# Patient Record
Sex: FEMALE | Race: WHITE | NOT HISPANIC OR LATINO
[De-identification: names, ages, dates, MRNs, and addresses within clinical notes are randomized per-mention and may not be internally consistent; named-entity substitution may affect disease eponyms.]

---

## 2017-04-26 ENCOUNTER — RECORD ABSTRACTING (OUTPATIENT)
Age: 37
End: 2017-04-26

## 2017-04-27 ENCOUNTER — APPOINTMENT (OUTPATIENT)
Dept: INTERNAL MEDICINE | Facility: CLINIC | Age: 37
End: 2017-04-27

## 2017-04-27 VITALS
DIASTOLIC BLOOD PRESSURE: 76 MMHG | SYSTOLIC BLOOD PRESSURE: 118 MMHG | HEART RATE: 104 BPM | HEIGHT: 62 IN | OXYGEN SATURATION: 98 % | BODY MASS INDEX: 33.13 KG/M2 | WEIGHT: 180 LBS | TEMPERATURE: 98.4 F | RESPIRATION RATE: 18 BRPM

## 2017-04-27 DIAGNOSIS — F41.1 GENERALIZED ANXIETY DISORDER: ICD-10-CM

## 2017-04-27 DIAGNOSIS — Z80.3 FAMILY HISTORY OF MALIGNANT NEOPLASM OF BREAST: ICD-10-CM

## 2017-04-27 DIAGNOSIS — Z78.9 OTHER SPECIFIED HEALTH STATUS: ICD-10-CM

## 2017-04-27 RX ORDER — ALPRAZOLAM 0.25 MG/1
0.25 TABLET ORAL
Refills: 0 | Status: COMPLETED | COMMUNITY
End: 2017-04-27

## 2017-06-14 ENCOUNTER — MEDICATION RENEWAL (OUTPATIENT)
Age: 37
End: 2017-06-14

## 2017-07-25 ENCOUNTER — APPOINTMENT (OUTPATIENT)
Dept: INTERNAL MEDICINE | Facility: CLINIC | Age: 37
End: 2017-07-25

## 2017-07-25 ENCOUNTER — NON-APPOINTMENT (OUTPATIENT)
Age: 37
End: 2017-07-25

## 2017-07-25 VITALS
BODY MASS INDEX: 33.68 KG/M2 | WEIGHT: 183 LBS | DIASTOLIC BLOOD PRESSURE: 82 MMHG | OXYGEN SATURATION: 98 % | SYSTOLIC BLOOD PRESSURE: 120 MMHG | HEIGHT: 62 IN | HEART RATE: 74 BPM | RESPIRATION RATE: 16 BRPM | TEMPERATURE: 98.4 F

## 2017-07-26 LAB
HCG SERPL QL: NEGATIVE
PAPP-A SERPL-ACNC: <1 MIU/ML
TSH SERPL-ACNC: 1.29 UIU/ML

## 2017-07-28 ENCOUNTER — MEDICATION RENEWAL (OUTPATIENT)
Age: 37
End: 2017-07-28

## 2017-09-18 ENCOUNTER — MEDICATION RENEWAL (OUTPATIENT)
Age: 37
End: 2017-09-18

## 2017-11-08 ENCOUNTER — RESULT CHARGE (OUTPATIENT)
Age: 37
End: 2017-11-08

## 2017-11-08 ENCOUNTER — APPOINTMENT (OUTPATIENT)
Dept: INTERNAL MEDICINE | Facility: CLINIC | Age: 37
End: 2017-11-08
Payer: COMMERCIAL

## 2017-11-08 VITALS
TEMPERATURE: 97.9 F | OXYGEN SATURATION: 97 % | BODY MASS INDEX: 34.04 KG/M2 | DIASTOLIC BLOOD PRESSURE: 70 MMHG | RESPIRATION RATE: 18 BRPM | WEIGHT: 185 LBS | HEIGHT: 62 IN | HEART RATE: 92 BPM | SYSTOLIC BLOOD PRESSURE: 106 MMHG

## 2017-11-08 LAB — S PYO AG SPEC QL IA: NEGATIVE

## 2017-11-08 PROCEDURE — 99214 OFFICE O/P EST MOD 30 MIN: CPT | Mod: 25

## 2017-11-08 PROCEDURE — 87880 STREP A ASSAY W/OPTIC: CPT | Mod: QW

## 2017-12-01 ENCOUNTER — MEDICATION RENEWAL (OUTPATIENT)
Age: 37
End: 2017-12-01

## 2017-12-14 ENCOUNTER — APPOINTMENT (OUTPATIENT)
Dept: INTERNAL MEDICINE | Facility: CLINIC | Age: 37
End: 2017-12-14
Payer: COMMERCIAL

## 2017-12-14 VITALS
TEMPERATURE: 97.8 F | DIASTOLIC BLOOD PRESSURE: 72 MMHG | HEIGHT: 62 IN | HEART RATE: 70 BPM | RESPIRATION RATE: 16 BRPM | WEIGHT: 186 LBS | SYSTOLIC BLOOD PRESSURE: 116 MMHG | OXYGEN SATURATION: 98 % | BODY MASS INDEX: 34.23 KG/M2

## 2017-12-14 PROCEDURE — 99214 OFFICE O/P EST MOD 30 MIN: CPT

## 2017-12-14 RX ORDER — AZITHROMYCIN 250 MG/1
250 TABLET, FILM COATED ORAL
Qty: 1 | Refills: 0 | Status: DISCONTINUED | COMMUNITY
Start: 2017-11-08 | End: 2017-12-14

## 2017-12-14 RX ORDER — ALPRAZOLAM 0.25 MG/1
0.25 TABLET ORAL
Qty: 60 | Refills: 0 | Status: DISCONTINUED | COMMUNITY
Start: 2017-04-27 | End: 2017-12-14

## 2018-05-17 ENCOUNTER — APPOINTMENT (OUTPATIENT)
Dept: INTERNAL MEDICINE | Facility: CLINIC | Age: 38
End: 2018-05-17
Payer: COMMERCIAL

## 2018-05-17 VITALS
RESPIRATION RATE: 18 BRPM | SYSTOLIC BLOOD PRESSURE: 120 MMHG | HEIGHT: 62 IN | BODY MASS INDEX: 33.13 KG/M2 | OXYGEN SATURATION: 98 % | WEIGHT: 180 LBS | TEMPERATURE: 98.7 F | HEART RATE: 90 BPM | DIASTOLIC BLOOD PRESSURE: 58 MMHG

## 2018-05-17 PROCEDURE — 99214 OFFICE O/P EST MOD 30 MIN: CPT

## 2018-05-17 RX ORDER — TOBRAMYCIN 3 MG/ML
0.3 SOLUTION/ DROPS OPHTHALMIC
Qty: 5 | Refills: 0 | Status: DISCONTINUED | COMMUNITY
Start: 2018-05-06

## 2018-05-17 NOTE — HISTORY OF PRESENT ILLNESS
[FreeTextEntry8] : Anxiety\benjamin Has anxiety intermittently for several years which was treated effectively with xanax.    Over the past week she has had increased anxiety.  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 She is also dealing with her grandparents estate.  She had been doing a lot of meditation and  hadn't needed any xanax in some time.   Never had formal psychotherapy or on SSRI.         \par \benjamin Also has a lump on her left wrist for 1 year.  Non tender. Told years ago -ganglion.

## 2018-05-17 NOTE — COUNSELING
[Healthy eating counseling provided] : healthy eating [___ min/wk activity recommended] : [unfilled] min/wk activity recommended [Engage in a relaxing activity] : Engage in a relaxing activity

## 2018-05-17 NOTE — REVIEW OF SYSTEMS
[Joint Swelling] : joint swelling [Anxiety] : anxiety [Negative] : Neurological [Joint Stiffness] : no joint stiffness [FreeTextEntry4] : cold sore

## 2018-05-17 NOTE — ASSESSMENT
[FreeTextEntry1] : Refilled xanax.  Pt advised if usage required for more than 2 weeks should consider use of SSRI\par Side effects of meds reviewed including potential for sedation and dependency.   Advised not to drink alcohol, drive or operate heavy machinery while on medication.\par Psychotherapy recommended however pt declines at this time.  \par continue exercise and meditation\par Valtrex prn for outbreaks of HSV1\par Do FBW check yearly labs including thyroids.\par

## 2018-05-17 NOTE — PHYSICAL EXAM
[No Acute Distress] : no acute distress [Well Nourished] : well nourished [No JVD] : no jugular venous distention [Supple] : supple [No Respiratory Distress] : no respiratory distress  [Clear to Auscultation] : lungs were clear to auscultation bilaterally [Normal Rate] : normal rate  [Regular Rhythm] : with a regular rhythm [No Edema] : there was no peripheral edema [No Extremity Clubbing/Cyanosis] : no extremity clubbing/cyanosis [de-identified] : faded lesion to lower lip [de-identified] : 1 cm mobile soft nodule to dorsum of left wrist.  Non tender.

## 2018-05-22 ENCOUNTER — RESULT REVIEW (OUTPATIENT)
Age: 38
End: 2018-05-22

## 2018-05-23 LAB
25(OH)D3 SERPL-MCNC: 7.2 NG/ML
ALBUMIN SERPL ELPH-MCNC: 4.2 G/DL
ALP BLD-CCNC: 64 U/L
ALT SERPL-CCNC: 24 U/L
ANION GAP SERPL CALC-SCNC: 13 MMOL/L
APPEARANCE: CLEAR
AST SERPL-CCNC: 15 U/L
BACTERIA: NEGATIVE
BASOPHILS # BLD AUTO: 0.02 K/UL
BASOPHILS NFR BLD AUTO: 0.3 %
BILIRUB SERPL-MCNC: 0.3 MG/DL
BILIRUBIN URINE: NEGATIVE
BLOOD URINE: ABNORMAL
BUN SERPL-MCNC: 13 MG/DL
CALCIUM SERPL-MCNC: 9.4 MG/DL
CHLORIDE SERPL-SCNC: 106 MMOL/L
CHOLEST SERPL-MCNC: 168 MG/DL
CHOLEST/HDLC SERPL: 3.1 RATIO
CO2 SERPL-SCNC: 24 MMOL/L
COLOR: YELLOW
CREAT SERPL-MCNC: 0.67 MG/DL
EOSINOPHIL # BLD AUTO: 0.15 K/UL
EOSINOPHIL NFR BLD AUTO: 2.3 %
GLUCOSE QUALITATIVE U: NEGATIVE MG/DL
GLUCOSE SERPL-MCNC: 101 MG/DL
HBA1C MFR BLD HPLC: 4.9 %
HCT VFR BLD CALC: 38 %
HDLC SERPL-MCNC: 54 MG/DL
HGB BLD-MCNC: 12.1 G/DL
HYALINE CASTS: 0 /LPF
IMM GRANULOCYTES NFR BLD AUTO: 0.3 %
KETONES URINE: NEGATIVE
LDLC SERPL CALC-MCNC: 99 MG/DL
LEUKOCYTE ESTERASE URINE: NEGATIVE
LYMPHOCYTES # BLD AUTO: 2.31 K/UL
LYMPHOCYTES NFR BLD AUTO: 35.7 %
MAN DIFF?: NORMAL
MCHC RBC-ENTMCNC: 31.8 GM/DL
MCHC RBC-ENTMCNC: 31.8 PG
MCV RBC AUTO: 99.7 FL
MICROSCOPIC-UA: NORMAL
MONOCYTES # BLD AUTO: 0.57 K/UL
MONOCYTES NFR BLD AUTO: 8.8 %
NEUTROPHILS # BLD AUTO: 3.4 K/UL
NEUTROPHILS NFR BLD AUTO: 52.6 %
NITRITE URINE: NEGATIVE
PH URINE: 5
PLATELET # BLD AUTO: 399 K/UL
POTASSIUM SERPL-SCNC: 4.2 MMOL/L
PROT SERPL-MCNC: 7 G/DL
PROTEIN URINE: NEGATIVE MG/DL
RBC # BLD: 3.81 M/UL
RBC # FLD: 13.3 %
RED BLOOD CELLS URINE: 3 /HPF
SODIUM SERPL-SCNC: 143 MMOL/L
SPECIFIC GRAVITY URINE: 1.03
SQUAMOUS EPITHELIAL CELLS: 10 /HPF
TRIGL SERPL-MCNC: 73 MG/DL
TSH SERPL-ACNC: 0.86 UIU/ML
UROBILINOGEN URINE: NEGATIVE MG/DL
WBC # FLD AUTO: 6.47 K/UL
WHITE BLOOD CELLS URINE: 1 /HPF

## 2018-05-24 ENCOUNTER — RESULT REVIEW (OUTPATIENT)
Age: 38
End: 2018-05-24

## 2018-06-29 ENCOUNTER — RX RENEWAL (OUTPATIENT)
Age: 38
End: 2018-06-29

## 2018-07-16 ENCOUNTER — RX RENEWAL (OUTPATIENT)
Age: 38
End: 2018-07-16

## 2018-07-22 PROBLEM — Z78.9 ALCOHOL USE: Status: ACTIVE | Noted: 2017-04-27

## 2018-08-10 ENCOUNTER — MEDICATION RENEWAL (OUTPATIENT)
Age: 38
End: 2018-08-10

## 2018-08-31 ENCOUNTER — RX RENEWAL (OUTPATIENT)
Age: 38
End: 2018-08-31

## 2018-09-24 ENCOUNTER — RX RENEWAL (OUTPATIENT)
Age: 38
End: 2018-09-24

## 2018-10-08 ENCOUNTER — RX RENEWAL (OUTPATIENT)
Age: 38
End: 2018-10-08

## 2018-11-01 ENCOUNTER — MEDICATION RENEWAL (OUTPATIENT)
Age: 38
End: 2018-11-01

## 2018-12-18 ENCOUNTER — RX RENEWAL (OUTPATIENT)
Age: 38
End: 2018-12-18

## 2019-02-15 ENCOUNTER — MEDICATION RENEWAL (OUTPATIENT)
Age: 39
End: 2019-02-15

## 2019-03-18 ENCOUNTER — RX RENEWAL (OUTPATIENT)
Age: 39
End: 2019-03-18

## 2019-03-25 ENCOUNTER — MEDICATION RENEWAL (OUTPATIENT)
Age: 39
End: 2019-03-25

## 2019-04-03 ENCOUNTER — APPOINTMENT (OUTPATIENT)
Dept: INTERNAL MEDICINE | Facility: CLINIC | Age: 39
End: 2019-04-03
Payer: COMMERCIAL

## 2019-04-03 VITALS
BODY MASS INDEX: 27.97 KG/M2 | TEMPERATURE: 98.1 F | SYSTOLIC BLOOD PRESSURE: 100 MMHG | HEIGHT: 62 IN | OXYGEN SATURATION: 98 % | DIASTOLIC BLOOD PRESSURE: 68 MMHG | WEIGHT: 152 LBS | RESPIRATION RATE: 16 BRPM | HEART RATE: 76 BPM

## 2019-04-03 PROCEDURE — 99213 OFFICE O/P EST LOW 20 MIN: CPT

## 2019-04-03 NOTE — PHYSICAL EXAM
[No Acute Distress] : no acute distress [Well Nourished] : well nourished [Well Developed] : well developed [Supple] : supple [No Lymphadenopathy] : no lymphadenopathy [No Respiratory Distress] : no respiratory distress  [Clear to Auscultation] : lungs were clear to auscultation bilaterally [No Accessory Muscle Use] : no accessory muscle use [Normal Rate] : normal rate  [Regular Rhythm] : with a regular rhythm [Normal S1, S2] : normal S1 and S2 [Normal Posterior Cervical Nodes] : no posterior cervical lymphadenopathy [Normal Anterior Cervical Nodes] : no anterior cervical lymphadenopathy [Normal Gait] : normal gait [Coordination Grossly Intact] : coordination grossly intact [No Focal Deficits] : no focal deficits [Normal Affect] : the affect was normal [Alert and Oriented x3] : oriented to person, place, and time [Normal Insight/Judgement] : insight and judgment were intact

## 2019-04-03 NOTE — HISTORY OF PRESENT ILLNESS
[FreeTextEntry8] : The patient is a 38 year-old female presents today with history of anxiety. She is requesting renewal of her Xanax. She will be traveling to [facility] in [place] next week and will be using it for the flights. She takes Xanax intermittently for her treatment of anxiety symptoms, with goof relief. She has never had formal therapy or been on an SSRI. She denies suicidal ideation.

## 2019-04-03 NOTE — REVIEW OF SYSTEMS
[Anxiety] : anxiety [Negative] : Heme/Lymph [Fever] : no fever [Chills] : no chills [Fatigue] : no fatigue [de-identified] : see HPI

## 2019-04-03 NOTE — ASSESSMENT
[FreeTextEntry1] : refilled Xanax\par side effects of medication  reviewed including sedation and potential for dependency.\par Counseling for anxiety recommended, however, pt declines \par Pt needs appt for yearly physical, states will call  when back for [place]

## 2019-05-16 ENCOUNTER — MEDICATION RENEWAL (OUTPATIENT)
Age: 39
End: 2019-05-16

## 2019-06-14 ENCOUNTER — MEDICATION RENEWAL (OUTPATIENT)
Age: 39
End: 2019-06-14

## 2019-07-10 ENCOUNTER — LABORATORY RESULT (OUTPATIENT)
Age: 39
End: 2019-07-10

## 2019-07-10 ENCOUNTER — APPOINTMENT (OUTPATIENT)
Dept: INTERNAL MEDICINE | Facility: CLINIC | Age: 39
End: 2019-07-10
Payer: COMMERCIAL

## 2019-07-10 VITALS
HEIGHT: 62 IN | TEMPERATURE: 98.3 F | SYSTOLIC BLOOD PRESSURE: 116 MMHG | OXYGEN SATURATION: 98 % | DIASTOLIC BLOOD PRESSURE: 80 MMHG | RESPIRATION RATE: 18 BRPM | HEART RATE: 86 BPM | WEIGHT: 156 LBS | BODY MASS INDEX: 28.71 KG/M2

## 2019-07-10 DIAGNOSIS — Z87.898 PERSONAL HISTORY OF OTHER SPECIFIED CONDITIONS: ICD-10-CM

## 2019-07-10 DIAGNOSIS — Z87.09 PERSONAL HISTORY OF OTHER DISEASES OF THE RESPIRATORY SYSTEM: ICD-10-CM

## 2019-07-10 DIAGNOSIS — K14.6 GLOSSODYNIA: ICD-10-CM

## 2019-07-10 DIAGNOSIS — R21 RASH AND OTHER NONSPECIFIC SKIN ERUPTION: ICD-10-CM

## 2019-07-10 DIAGNOSIS — Z72.820 SLEEP DEPRIVATION: ICD-10-CM

## 2019-07-10 DIAGNOSIS — R53.83 OTHER FATIGUE: ICD-10-CM

## 2019-07-10 PROCEDURE — 99214 OFFICE O/P EST MOD 30 MIN: CPT

## 2019-07-10 RX ORDER — ERGOCALCIFEROL 1.25 MG/1
1.25 MG CAPSULE, LIQUID FILLED ORAL
Qty: 8 | Refills: 0 | Status: DISCONTINUED | COMMUNITY
Start: 2018-07-16 | End: 2019-07-10

## 2019-07-10 RX ORDER — CEPHALEXIN 500 MG/1
500 CAPSULE ORAL
Qty: 21 | Refills: 0 | Status: DISCONTINUED | COMMUNITY
Start: 2019-06-17 | End: 2019-07-10

## 2019-07-10 RX ORDER — COPPER 313.4 MG/1
INTRAUTERINE DEVICE INTRAUTERINE
Qty: 1 | Refills: 0 | Status: ACTIVE | COMMUNITY
Start: 2019-06-17

## 2019-07-10 NOTE — PHYSICAL EXAM
[No Acute Distress] : no acute distress [Well Developed] : well developed [Well Nourished] : well nourished [Well-Appearing] : well-appearing [Normal Sclera/Conjunctiva] : normal sclera/conjunctiva [PERRL] : pupils equal round and reactive to light [Normal Outer Ear/Nose] : the outer ears and nose were normal in appearance [EOMI] : extraocular movements intact [Normal Oropharynx] : the oropharynx was normal [Supple] : supple [No JVD] : no jugular venous distention [No Lymphadenopathy] : no lymphadenopathy [No Accessory Muscle Use] : no accessory muscle use [No Respiratory Distress] : no respiratory distress  [Thyroid Normal, No Nodules] : the thyroid was normal and there were no nodules present [Normal Rate] : normal rate  [Clear to Auscultation] : lungs were clear to auscultation bilaterally [Regular Rhythm] : with a regular rhythm [No Murmur] : no murmur heard [Normal S1, S2] : normal S1 and S2 [No Varicosities] : no varicosities [No Carotid Bruits] : no carotid bruits [No Abdominal Bruit] : a ~M bruit was not heard ~T in the abdomen [Pedal Pulses Present] : the pedal pulses are present [No Edema] : there was no peripheral edema [No Extremity Clubbing/Cyanosis] : no extremity clubbing/cyanosis [No Palpable Aorta] : no palpable aorta [Soft] : abdomen soft [Non Tender] : non-tender [Non-distended] : non-distended [No Masses] : no abdominal mass palpated [No HSM] : no HSM [Normal Bowel Sounds] : normal bowel sounds [Normal Anterior Cervical Nodes] : no anterior cervical lymphadenopathy [Normal Posterior Cervical Nodes] : no posterior cervical lymphadenopathy [No Spinal Tenderness] : no spinal tenderness [No CVA Tenderness] : no CVA  tenderness [Grossly Normal Strength/Tone] : grossly normal strength/tone [No Joint Swelling] : no joint swelling [No Focal Deficits] : no focal deficits [No Rash] : no rash [Coordination Grossly Intact] : coordination grossly intact [Normal Gait] : normal gait [Deep Tendon Reflexes (DTR)] : deep tendon reflexes were 2+ and symmetric [Normal Affect] : the affect was normal [Normal Insight/Judgement] : insight and judgment were intact

## 2019-07-10 NOTE — HISTORY OF PRESENT ILLNESS
[de-identified] : Ms. Figueroa presents for followup evaluation. She is complaining of headache and dizziness. She was recently started on NuvaRing for birth control approximately 2 weeks ago. After starting on the NuvaRing Ms. Figueroa states she began getting symptoms of dizziness, headache and nausea. After approximately one week she discontinued it. She continues to have symptoms although they are somewhat improved. Ms. Figueroa states that she's had previous adverse reactions to birth control in the past. She has a prescription for an IUD. The patient denies any chest pain, shortness of breath or palpitations. She has no hematuria or dysuria. [FreeTextEntry1] : Dizziness,  headache

## 2019-07-10 NOTE — PLAN
[FreeTextEntry1] : Ms. Figueroa presents for followup evaluation. She is complaining of dizziness, headache and lightheadedness. She had been started on NuvaRing for birth control however she discontinued it after one week. Patient will have comprehensive blood profile drawn. She will followup with her gynecologist here she does have a prescription for an IUD.

## 2019-07-11 LAB
ALBUMIN SERPL ELPH-MCNC: 4.1 G/DL
ALP BLD-CCNC: 58 U/L
ALT SERPL-CCNC: 18 U/L
ANION GAP SERPL CALC-SCNC: 11 MMOL/L
APPEARANCE: CLEAR
AST SERPL-CCNC: 18 U/L
BACTERIA: NEGATIVE
BASOPHILS # BLD AUTO: 0.04 K/UL
BASOPHILS NFR BLD AUTO: 0.7 %
BILIRUB SERPL-MCNC: 0.3 MG/DL
BILIRUBIN URINE: NEGATIVE
BLOOD URINE: NEGATIVE
BUN SERPL-MCNC: 8 MG/DL
CALCIUM SERPL-MCNC: 9 MG/DL
CHLORIDE SERPL-SCNC: 107 MMOL/L
CHOLEST SERPL-MCNC: 147 MG/DL
CHOLEST/HDLC SERPL: 2.4 RATIO
CO2 SERPL-SCNC: 22 MMOL/L
COLOR: NORMAL
CREAT SERPL-MCNC: 0.67 MG/DL
EOSINOPHIL # BLD AUTO: 0.15 K/UL
EOSINOPHIL NFR BLD AUTO: 2.5 %
ESTIMATED AVERAGE GLUCOSE: 88 MG/DL
GLUCOSE QUALITATIVE U: NEGATIVE
GLUCOSE SERPL-MCNC: 79 MG/DL
HBA1C MFR BLD HPLC: 4.7 %
HCT VFR BLD CALC: 39.3 %
HDLC SERPL-MCNC: 61 MG/DL
HGB BLD-MCNC: 12 G/DL
HYALINE CASTS: 1 /LPF
IMM GRANULOCYTES NFR BLD AUTO: 0.3 %
IRON SERPL-MCNC: 42 UG/DL
KETONES URINE: NEGATIVE
LDLC SERPL CALC-MCNC: 78 MG/DL
LEUKOCYTE ESTERASE URINE: NEGATIVE
LYMPHOCYTES # BLD AUTO: 2.82 K/UL
LYMPHOCYTES NFR BLD AUTO: 46.2 %
MAN DIFF?: NORMAL
MCHC RBC-ENTMCNC: 30.5 GM/DL
MCHC RBC-ENTMCNC: 32.3 PG
MCV RBC AUTO: 105.6 FL
MICROSCOPIC-UA: NORMAL
MONOCYTES # BLD AUTO: 0.51 K/UL
MONOCYTES NFR BLD AUTO: 8.3 %
NEUTROPHILS # BLD AUTO: 2.57 K/UL
NEUTROPHILS NFR BLD AUTO: 42 %
NITRITE URINE: NEGATIVE
PH URINE: 6
PLATELET # BLD AUTO: 345 K/UL
POTASSIUM SERPL-SCNC: 4.2 MMOL/L
PROT SERPL-MCNC: 6.4 G/DL
PROTEIN URINE: NEGATIVE
RBC # BLD: 3.72 M/UL
RBC # FLD: 12.7 %
RED BLOOD CELLS URINE: 1 /HPF
SODIUM SERPL-SCNC: 140 MMOL/L
SPECIFIC GRAVITY URINE: 1.02
SQUAMOUS EPITHELIAL CELLS: 5 /HPF
TRIGL SERPL-MCNC: 38 MG/DL
TSH SERPL-ACNC: 0.68 UIU/ML
UROBILINOGEN URINE: NORMAL
WBC # FLD AUTO: 6.11 K/UL
WHITE BLOOD CELLS URINE: 1 /HPF

## 2019-07-23 ENCOUNTER — MEDICATION RENEWAL (OUTPATIENT)
Age: 39
End: 2019-07-23

## 2019-09-10 ENCOUNTER — MEDICATION RENEWAL (OUTPATIENT)
Age: 39
End: 2019-09-10

## 2019-10-03 ENCOUNTER — MEDICATION RENEWAL (OUTPATIENT)
Age: 39
End: 2019-10-03

## 2019-11-26 ENCOUNTER — MEDICATION RENEWAL (OUTPATIENT)
Age: 39
End: 2019-11-26

## 2020-02-19 ENCOUNTER — APPOINTMENT (OUTPATIENT)
Dept: INTERNAL MEDICINE | Facility: CLINIC | Age: 40
End: 2020-02-19
Payer: COMMERCIAL

## 2020-02-19 VITALS
TEMPERATURE: 98.4 F | RESPIRATION RATE: 18 BRPM | HEIGHT: 62 IN | WEIGHT: 149 LBS | HEART RATE: 75 BPM | OXYGEN SATURATION: 99 % | DIASTOLIC BLOOD PRESSURE: 70 MMHG | SYSTOLIC BLOOD PRESSURE: 116 MMHG | BODY MASS INDEX: 27.42 KG/M2

## 2020-02-19 DIAGNOSIS — F17.210 NICOTINE DEPENDENCE, CIGARETTES, UNCOMPLICATED: ICD-10-CM

## 2020-02-19 PROCEDURE — 99214 OFFICE O/P EST MOD 30 MIN: CPT

## 2020-02-19 RX ORDER — MISOPROSTOL 100 UG/1
100 TABLET ORAL
Qty: 2 | Refills: 0 | Status: DISCONTINUED | COMMUNITY
Start: 2019-06-20 | End: 2020-02-19

## 2020-02-19 RX ORDER — ETONOGESTREL AND ETHINYL ESTRADIOL .12; .015 MG/D; MG/D
0.12-0.015 INSERT, EXTENDED RELEASE VAGINAL
Qty: 1 | Refills: 0 | Status: DISCONTINUED | COMMUNITY
Start: 2019-07-03 | End: 2020-02-19

## 2020-02-19 NOTE — PLAN
[FreeTextEntry1] : Ms. Figueroa presents for followup evaluation. She has a history of anxiety. Prescription for Xanax been renewed. Patient be given a prescription comprehensive blood profile Will follow up in 6 months for complete physical examination.

## 2020-02-19 NOTE — HISTORY OF PRESENT ILLNESS
[de-identified] : Ms. Figueroa presents for followup evaluation. She is feeling well. Patient denies any chest pain, shortness of breath palpitations. She does have some symptoms of anxiety. She denies being . Ms. Figueroa has no hematuria or dysuria. [FreeTextEntry1] : Followup

## 2020-02-19 NOTE — PHYSICAL EXAM
[No Acute Distress] : no acute distress [Well-Appearing] : well-appearing [Well Developed] : well developed [Well Nourished] : well nourished [PERRL] : pupils equal round and reactive to light [Normal Sclera/Conjunctiva] : normal sclera/conjunctiva [EOMI] : extraocular movements intact [Normal Oropharynx] : the oropharynx was normal [Normal Outer Ear/Nose] : the outer ears and nose were normal in appearance [No Lymphadenopathy] : no lymphadenopathy [Supple] : supple [No JVD] : no jugular venous distention [Thyroid Normal, No Nodules] : the thyroid was normal and there were no nodules present [No Respiratory Distress] : no respiratory distress  [No Accessory Muscle Use] : no accessory muscle use [Clear to Auscultation] : lungs were clear to auscultation bilaterally [Normal Rate] : normal rate  [Regular Rhythm] : with a regular rhythm [No Murmur] : no murmur heard [Normal S1, S2] : normal S1 and S2 [No Varicosities] : no varicosities [No Abdominal Bruit] : a ~M bruit was not heard ~T in the abdomen [No Carotid Bruits] : no carotid bruits [No Edema] : there was no peripheral edema [Pedal Pulses Present] : the pedal pulses are present [No Palpable Aorta] : no palpable aorta [No Extremity Clubbing/Cyanosis] : no extremity clubbing/cyanosis [Non-distended] : non-distended [Non Tender] : non-tender [Soft] : abdomen soft [No Masses] : no abdominal mass palpated [No HSM] : no HSM [Normal Bowel Sounds] : normal bowel sounds [Normal Posterior Cervical Nodes] : no posterior cervical lymphadenopathy [Normal Anterior Cervical Nodes] : no anterior cervical lymphadenopathy [No CVA Tenderness] : no CVA  tenderness [No Spinal Tenderness] : no spinal tenderness [No Joint Swelling] : no joint swelling [No Rash] : no rash [Grossly Normal Strength/Tone] : grossly normal strength/tone [Coordination Grossly Intact] : coordination grossly intact [No Focal Deficits] : no focal deficits [Deep Tendon Reflexes (DTR)] : deep tendon reflexes were 2+ and symmetric [Normal Gait] : normal gait [Normal Affect] : the affect was normal [Normal Insight/Judgement] : insight and judgment were intact

## 2020-06-11 ENCOUNTER — APPOINTMENT (OUTPATIENT)
Dept: INTERNAL MEDICINE | Facility: CLINIC | Age: 40
End: 2020-06-11
Payer: COMMERCIAL

## 2020-06-11 DIAGNOSIS — B00.1 HERPESVIRAL VESICULAR DERMATITIS: ICD-10-CM

## 2020-06-11 PROCEDURE — 99213 OFFICE O/P EST LOW 20 MIN: CPT | Mod: 95

## 2020-06-11 NOTE — ASSESSMENT
[FreeTextEntry1] : \par Pt declines starting SSRI at this time.\par Xanax refilled\par Side effects of meds reviewed including potential for sedation and dependency.   Advised not to drink alcohol, drive or operate heavy machinery while on medication.\par Continue psychotherapy.\par \par FU for CPE.  Do FBW prior to visit.

## 2020-06-11 NOTE — HISTORY OF PRESENT ILLNESS
[Home] : at home, [unfilled] , at the time of the visit. [Medical Office: ([facility])___] : at the medical office located in  [Verbal consent obtained from patient] : the patient, [unfilled] [FreeTextEntry1] : med refill [de-identified] : Requests visit to refill Xanax.  Takes meds prn for anxiety which during early period of pandemic was much worse but getting better now.  At times she does feel depressed.  No SI.  She has been in contact with her therapist which helps greatly.  Her anxiety is also managed with techniques she has learned over time and she feels overall she has a good handle on her anxiety.  Prior to the pandemic she felt her happiest.  Therapist has discussed use of meds as an option but she prefers not and is  Concerned with weight gain.\par Using Valtrex prn for cold sores - come out more with stress.

## 2020-08-30 ENCOUNTER — RESULT CHARGE (OUTPATIENT)
Age: 40
End: 2020-08-30

## 2020-08-31 ENCOUNTER — APPOINTMENT (OUTPATIENT)
Dept: INTERNAL MEDICINE | Facility: CLINIC | Age: 40
End: 2020-08-31
Payer: COMMERCIAL

## 2020-08-31 ENCOUNTER — NON-APPOINTMENT (OUTPATIENT)
Age: 40
End: 2020-08-31

## 2020-08-31 VITALS
BODY MASS INDEX: 29.44 KG/M2 | WEIGHT: 160 LBS | SYSTOLIC BLOOD PRESSURE: 104 MMHG | OXYGEN SATURATION: 99 % | TEMPERATURE: 98 F | RESPIRATION RATE: 18 BRPM | HEART RATE: 65 BPM | HEIGHT: 62 IN | DIASTOLIC BLOOD PRESSURE: 82 MMHG

## 2020-08-31 DIAGNOSIS — Z87.898 PERSONAL HISTORY OF OTHER SPECIFIED CONDITIONS: ICD-10-CM

## 2020-08-31 DIAGNOSIS — M67.432 GANGLION, LEFT WRIST: ICD-10-CM

## 2020-08-31 DIAGNOSIS — R42 DIZZINESS AND GIDDINESS: ICD-10-CM

## 2020-08-31 PROCEDURE — 99215 OFFICE O/P EST HI 40 MIN: CPT

## 2020-08-31 NOTE — PHYSICAL EXAM
[No Acute Distress] : no acute distress [Well Nourished] : well nourished [Well Developed] : well developed [Normal Sclera/Conjunctiva] : normal sclera/conjunctiva [PERRL] : pupils equal round and reactive to light [EOMI] : extraocular movements intact [Normal Outer Ear/Nose] : the outer ears and nose were normal in appearance [Normal Oropharynx] : the oropharynx was normal [No Lymphadenopathy] : no lymphadenopathy [Supple] : supple [No Respiratory Distress] : no respiratory distress  [No Accessory Muscle Use] : no accessory muscle use [Clear to Auscultation] : lungs were clear to auscultation bilaterally [Regular Rhythm] : with a regular rhythm [Normal Rate] : normal rate  [Normal S1, S2] : normal S1 and S2 [Pedal Pulses Present] : the pedal pulses are present [No Extremity Clubbing/Cyanosis] : no extremity clubbing/cyanosis [Soft] : abdomen soft [Non Tender] : non-tender [Non-distended] : non-distended [Normal Bowel Sounds] : normal bowel sounds [Normal Posterior Cervical Nodes] : no posterior cervical lymphadenopathy [Normal Anterior Cervical Nodes] : no anterior cervical lymphadenopathy [Coordination Grossly Intact] : coordination grossly intact [No Focal Deficits] : no focal deficits [Normal Gait] : normal gait [Normal Affect] : the affect was normal [Alert and Oriented x3] : oriented to person, place, and time [Normal Insight/Judgement] : insight and judgment were intact

## 2020-09-02 NOTE — REVIEW OF SYSTEMS
[Negative] : Psychiatric [Chills] : no chills [Fever] : no fever [Fatigue] : no fatigue [de-identified] : see HPi

## 2020-09-02 NOTE — ASSESSMENT
[As per surgery] : as per surgery [Patient Optimized for Surgery] : Patient optimized for surgery [FreeTextEntry2] : Close airway monitoring and oxygen saturation is required.  [FreeTextEntry4] : Advised to hold all vitamins, NSAIDs, including Motrin, Advil, ASA, fish oil, all supplements 7 days prior to surgery. \par \par COVID PCR per surgery.\par \par \par

## 2020-09-02 NOTE — RESULTS/DATA
[] : results reviewed [de-identified] : 8/31/20- results TWAN [de-identified] : 8/31/20- results TWAN [de-identified] : RSR 68, NL axis, No acute ST - T wave changes

## 2020-09-02 NOTE — HISTORY OF PRESENT ILLNESS
[No Pertinent Cardiac History] : no history of aortic stenosis, atrial fibrillation, coronary artery disease, recent myocardial infarction, or implantable device/pacemaker [No Pertinent Pulmonary History] : no history of asthma, COPD, sleep apnea, or smoking [No Adverse Anesthesia Reaction] : no adverse anesthesia reaction in self or family member [(Patient denies any chest pain, claudication, dyspnea on exertion, orthopnea, palpitations or syncope)] : Patient denies any chest pain, claudication, dyspnea on exertion, orthopnea, palpitations or syncope [Chronic Kidney Disease] : no chronic kidney disease [Chronic Anticoagulation] : no chronic anticoagulation [FreeTextEntry1] : left ganglion cyst [Diabetes] : no diabetes [FreeTextEntry3] :   [FreeTextEntry2] : 9/3/20  [FreeTextEntry4] : Pt is a 40 yr. old female with a h/o  of anxiety,. depression, obesity. She takes Xanax  PRN . \par She is scheduled for surgery left wrist cyst on 9/3/20.by Dr. Mckoy . She notes has had the cyst for years , it has been bothering her more over the past few months and interferes with ADL's .  \par She is a past smoker, 1 PPD x 9 years, quit 4 years ago. \par Denies fever, chills, shortness of  breath, chest pain,  cough or abdominal pain.  [FreeTextEntry8] : pt able to walk 1 flight up steps with out difficulty

## 2020-09-17 ENCOUNTER — EMERGENCY (EMERGENCY)
Facility: HOSPITAL | Age: 40
LOS: 0 days | Discharge: HOME | End: 2020-09-17
Payer: COMMERCIAL

## 2020-09-17 VITALS
DIASTOLIC BLOOD PRESSURE: 68 MMHG | HEART RATE: 90 BPM | TEMPERATURE: 99 F | RESPIRATION RATE: 17 BRPM | OXYGEN SATURATION: 98 % | SYSTOLIC BLOOD PRESSURE: 105 MMHG

## 2020-09-17 DIAGNOSIS — J06.9 ACUTE UPPER RESPIRATORY INFECTION, UNSPECIFIED: ICD-10-CM

## 2020-09-17 DIAGNOSIS — Z20.828 CONTACT WITH AND (SUSPECTED) EXPOSURE TO OTHER VIRAL COMMUNICABLE DISEASES: ICD-10-CM

## 2020-09-17 DIAGNOSIS — R05 COUGH: ICD-10-CM

## 2020-09-17 DIAGNOSIS — M79.10 MYALGIA, UNSPECIFIED SITE: ICD-10-CM

## 2020-09-17 DIAGNOSIS — R50.9 FEVER, UNSPECIFIED: ICD-10-CM

## 2020-09-17 PROCEDURE — U0003: CPT

## 2020-09-17 PROCEDURE — 99283 EMERGENCY DEPT VISIT LOW MDM: CPT

## 2020-09-17 NOTE — ED STATDOCS - PHYSICAL EXAMINATION
PA note: Constitutional: NAD AAOx3  Eyes: EOMI, pupils equal  Head: Normocephalic, atraumatic  ENT: Throat is clear without erythema. No exudates. Airway is patent.  Mouth: no airway obstruction.   Cardiac: S1 S2. regular rate   Resp: Non-labored breathing, no tachypnea. Lungs CTA b/l. No w/r/r. Equal chest expansion and rise. O2sat 100% RA  GI: Abd soft. NT/ND.   Neuro: CN2-12 intact. No focal deficits.   Skin: No rashes  ~Mango Harley PA-C

## 2020-09-17 NOTE — ED STATDOCS - NS ED ROS FT
ROS: Constitutional- +fever, Respiratory- +cough, NO SOB  Cardiac- no chest pain, no palpitations, ENT- +rhinorrhea, +sore throat, +sinus congestion. DENIES loss of sense of smell or taste.  Abdomen- No nausea, no vomiting, no diarrhea.  Urinary- no dysuria, no urgency, no frequency.  Skin- No rashes ~ Mango Harley PA-C

## 2020-09-17 NOTE — ED STATDOCS - PROGRESS NOTE DETAILS
How to get your Coronavirus (COVID-19) Testing Results:   Please be advised that you were tested for the coronavirus (COVID-19) in the Emergency Department at [facility].  You are to maintain self-quarantine procedures for 14 days until instructed otherwise by one of our healthcare agents. Please note that the test may take up to 2-4 days to result.  If you do not hear from us within 72 hours and you'd like to check on your results, you can call on of our coronavirus specialists at [address] [place] [address] (available 24/7).  Please DO NOT call the site where you received the test to obtain your results. ~Mango Harley PA-C

## 2020-09-17 NOTE — ED STATDOCS - PATIENT PORTAL LINK FT
You can access the FollowMyHealth Patient Portal offered by [facility] by registering at the following website: [email or web address]. By joining [facility]’s FollowMyHealth portal, you will also be able to view your health information using other applications (apps) compatible with our system.

## 2020-09-17 NOTE — ED STATDOCS - OBJECTIVE STATEMENT
Patient presents to [facility] with c/o fever, cough, runny nose, body aches, HA's, sore throat x 3 days. Patient was recently exposed to COVID-19. Patient is here for COVID test. ~Mango Harley PA-C

## 2020-09-17 NOTE — ED STATDOCS - CLINICAL SUMMARY MEDICAL DECISION MAKING FREE TEXT BOX
Patient presents with URI symptoms, fever and concern for COVID exposure.  As patient is nontoxic appearing will test for COVID and d/c.  Quarantine reviewed and return precautions reviewed. ~Mango Harley PA-C

## 2020-09-18 LAB — SARS-COV-2 RNA SPEC QL NAA+PROBE: SIGNIFICANT CHANGE UP

## 2020-09-23 ENCOUNTER — APPOINTMENT (OUTPATIENT)
Dept: INTERNAL MEDICINE | Facility: CLINIC | Age: 40
End: 2020-09-23

## 2020-10-07 ENCOUNTER — APPOINTMENT (OUTPATIENT)
Dept: INTERNAL MEDICINE | Facility: CLINIC | Age: 40
End: 2020-10-07
Payer: COMMERCIAL

## 2020-10-07 VITALS
HEIGHT: 62 IN | WEIGHT: 150 LBS | BODY MASS INDEX: 27.6 KG/M2 | HEART RATE: 97 BPM | DIASTOLIC BLOOD PRESSURE: 62 MMHG | RESPIRATION RATE: 18 BRPM | SYSTOLIC BLOOD PRESSURE: 112 MMHG | TEMPERATURE: 98.8 F | OXYGEN SATURATION: 97 %

## 2020-10-07 DIAGNOSIS — Z01.818 ENCOUNTER FOR OTHER PREPROCEDURAL EXAMINATION: ICD-10-CM

## 2020-10-07 PROBLEM — Z78.9 OTHER SPECIFIED HEALTH STATUS: Chronic | Status: ACTIVE | Noted: 2020-09-17

## 2020-10-07 PROCEDURE — 99214 OFFICE O/P EST MOD 30 MIN: CPT

## 2020-10-07 NOTE — PHYSICAL EXAM
[No Acute Distress] : no acute distress [Well Nourished] : well nourished [Well Developed] : well developed [No Respiratory Distress] : no respiratory distress  [No Accessory Muscle Use] : no accessory muscle use [Clear to Auscultation] : lungs were clear to auscultation bilaterally [Normal Rate] : normal rate  [Regular Rhythm] : with a regular rhythm [Normal S1, S2] : normal S1 and S2 [Pedal Pulses Present] : the pedal pulses are present [No Extremity Clubbing/Cyanosis] : no extremity clubbing/cyanosis [Coordination Grossly Intact] : coordination grossly intact [No Focal Deficits] : no focal deficits [Normal Gait] : normal gait [Normal Affect] : the affect was normal [Alert and Oriented x3] : oriented to person, place, and time [Normal Insight/Judgement] : insight and judgment were intact

## 2020-10-09 NOTE — HISTORY OF PRESENT ILLNESS
[FreeTextEntry8] : Pt is a 40 yr. old female with a h/ of generalized anxiety, Vit D deficiency , obesity. She presents teary eyed, states she has lately been feeling depressed. SHe has been taking care of her mother who has MS and working 13 hrs days. Her daughter recently left for college  and she has little support services.  . She sees Dr. Rai Schultz , psychologist regularly who suggested she start an antidepressant. She denies  suicidal ideation or self harm. She would like to try an  SSRI . Discussed in detail indications for usage .\par Denies fever, chills,  chest pain, pleuritic pain, shortness of breath. \par

## 2020-10-09 NOTE — ASSESSMENT
[FreeTextEntry1] : start Lexapro 10 mg po daily ( take 1/2 tab daily x 5 days to start) \par continue f/up psychologist , Dr. Schultz \par discussed side effects / indications of  use of med \par  to ER with any emergent sx;'s , increased depression, suicidal ideation.  \par F/up in office 4-6 weeks

## 2020-10-09 NOTE — REVIEW OF SYSTEMS
[Anxiety] : anxiety [Depression] : depression [Negative] : Integumentary [Fever] : no fever [Chills] : no chills [Fatigue] : no fatigue [Suicidal] : not suicidal [Insomnia] : no insomnia

## 2021-02-04 ENCOUNTER — NON-APPOINTMENT (OUTPATIENT)
Age: 41
End: 2021-02-04

## 2021-02-05 ENCOUNTER — APPOINTMENT (OUTPATIENT)
Dept: INTERNAL MEDICINE | Facility: CLINIC | Age: 41
End: 2021-02-05
Payer: COMMERCIAL

## 2021-02-05 DIAGNOSIS — U07.1 COVID-19: ICD-10-CM

## 2021-02-05 PROCEDURE — 99213 OFFICE O/P EST LOW 20 MIN: CPT | Mod: 95

## 2021-02-05 NOTE — HISTORY OF PRESENT ILLNESS
[Home] : at home, [unfilled] , at the time of the visit. [Medical Office: ([facility])___] : at the medical office located in  [Verbal consent obtained from patient] : the patient, [unfilled] [FreeTextEntry1] : positive corona virus PCR [de-identified] : Ms. Figueroa presents for a telemedicine followup. She was diagnosed with a positive corona virus PCR on 1/31. Currently, she is asymptomatic. Patient states she contacted the [employer] and was told that she would be cleared to return to work on 1/9.

## 2021-02-05 NOTE — PLAN
[FreeTextEntry1] : Ms. Figueroa was diagnosed with corona virus via nasal PCR on 1/31. Patient states he didn't go to public health although she would be cleared to return to work on 1/9. I will recheck the duration required for quarantine and followup with the patient as scheduled.

## 2021-02-09 ENCOUNTER — NON-APPOINTMENT (OUTPATIENT)
Age: 41
End: 2021-02-09

## 2021-03-22 ENCOUNTER — TRANSCRIPTION ENCOUNTER (OUTPATIENT)
Age: 41
End: 2021-03-22

## 2021-04-07 ENCOUNTER — NON-APPOINTMENT (OUTPATIENT)
Age: 41
End: 2021-04-07

## 2021-04-07 RX ORDER — ASCORBIC ACID 500 MG
500 TABLET ORAL DAILY
Refills: 0 | Status: ACTIVE | COMMUNITY

## 2021-07-09 ENCOUNTER — APPOINTMENT (OUTPATIENT)
Dept: INTERNAL MEDICINE | Facility: CLINIC | Age: 41
End: 2021-07-09
Payer: COMMERCIAL

## 2021-07-09 VITALS
DIASTOLIC BLOOD PRESSURE: 68 MMHG | HEART RATE: 80 BPM | OXYGEN SATURATION: 97 % | HEIGHT: 62 IN | TEMPERATURE: 97.4 F | RESPIRATION RATE: 16 BRPM | SYSTOLIC BLOOD PRESSURE: 118 MMHG | WEIGHT: 174 LBS | BODY MASS INDEX: 32.02 KG/M2

## 2021-07-09 PROCEDURE — 99072 ADDL SUPL MATRL&STAF TM PHE: CPT

## 2021-07-09 PROCEDURE — 99396 PREV VISIT EST AGE 40-64: CPT

## 2021-07-09 RX ORDER — ESCITALOPRAM OXALATE 10 MG/1
10 TABLET ORAL
Qty: 30 | Refills: 3 | Status: DISCONTINUED | COMMUNITY
Start: 2020-10-07 | End: 2021-07-09

## 2021-07-09 RX ORDER — CHLORHEXIDINE GLUCONATE 4 %
325 (65 FE) LIQUID (ML) TOPICAL
Qty: 90 | Refills: 1 | Status: DISCONTINUED | COMMUNITY
End: 2021-07-09

## 2021-07-09 RX ORDER — CITALOPRAM HYDROBROMIDE 20 MG/1
20 TABLET, FILM COATED ORAL
Qty: 30 | Refills: 1 | Status: DISCONTINUED | COMMUNITY
Start: 2020-10-09 | End: 2021-07-09

## 2021-07-09 NOTE — HEALTH RISK ASSESSMENT
[No] : In the past 12 months have you used drugs other than those required for medical reasons? No [0] : 2) Feeling down, depressed, or hopeless: Not at all (0) [Patient reported mammogram was normal] : Patient reported mammogram was normal [Patient reported PAP Smear was normal] : Patient reported PAP Smear was normal [Very Good] : ~his/her~  mood as very good [] : No [MammogramDate] : 01/21 [PapSmearDate] : 01/20

## 2021-07-09 NOTE — PLAN
[FreeTextEntry1] : Ms. Figueroa presents for an annual physical examination. Comprehensive lab work from March was reviewed with the patient. She remains overweight. Ms. Figueroa is a vegan. Lab work was within normal limits. I will forward her information regarding various weight loss programs. She will followup in one year.

## 2021-07-27 ENCOUNTER — APPOINTMENT (OUTPATIENT)
Dept: INTERNAL MEDICINE | Facility: CLINIC | Age: 41
End: 2021-07-27
Payer: COMMERCIAL

## 2021-07-27 VITALS
OXYGEN SATURATION: 98 % | TEMPERATURE: 98.5 F | WEIGHT: 171 LBS | BODY MASS INDEX: 31.47 KG/M2 | SYSTOLIC BLOOD PRESSURE: 102 MMHG | DIASTOLIC BLOOD PRESSURE: 70 MMHG | HEIGHT: 62 IN | RESPIRATION RATE: 16 BRPM | HEART RATE: 76 BPM

## 2021-07-27 DIAGNOSIS — R42 DIZZINESS AND GIDDINESS: ICD-10-CM

## 2021-07-27 PROCEDURE — 99214 OFFICE O/P EST MOD 30 MIN: CPT

## 2021-07-27 PROCEDURE — 99072 ADDL SUPL MATRL&STAF TM PHE: CPT

## 2021-07-27 NOTE — PLAN
[FreeTextEntry1] : Ms. Figueroa presents for a followup evaluation. She is complaining by intermittent episodes of dizziness. She may be experiencing vertigo. The patient will have a CBC, comprehensive metabolic panel, iron level and TSH level drawn. She will be given a trial of meclizine 25 mg q.8 hours p.r.n. I've advised him to take the meclizine on a regular basis for the first 48 hours and then use on a p.r.n. basis. If she continues to have significant dizziness she will modify me for reevaluation. Ms. Figueroa has also been referred for medical weight management program. She will followup in this office as scheduled.

## 2021-07-27 NOTE — HISTORY OF PRESENT ILLNESS
[FreeTextEntry1] : Followup evaluation [de-identified] : Ms. Figueroa presents for a followup evaluation. She is complaining of recurrent episodes of dizziness which started 3-4 days ago. 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 She had to terminate the performance. She did not lose consciousness. Since that time she's been having recurrent episodes of what she describes as dizziness and mild associated nausea. She has no associated headaches, chest pains or palpitations. Ms. Figueroa has not had a recent upper rest or infection.

## 2021-07-29 LAB
ALBUMIN SERPL ELPH-MCNC: 4.4 G/DL
ALP BLD-CCNC: 65 U/L
ALT SERPL-CCNC: 18 U/L
ANION GAP SERPL CALC-SCNC: 13 MMOL/L
AST SERPL-CCNC: 17 U/L
BASOPHILS # BLD AUTO: 0.03 K/UL
BASOPHILS NFR BLD AUTO: 0.6 %
BILIRUB SERPL-MCNC: 0.4 MG/DL
BUN SERPL-MCNC: 7 MG/DL
CALCIUM SERPL-MCNC: 9.4 MG/DL
CHLORIDE SERPL-SCNC: 105 MMOL/L
CO2 SERPL-SCNC: 21 MMOL/L
CREAT SERPL-MCNC: 0.62 MG/DL
EOSINOPHIL # BLD AUTO: 0.13 K/UL
EOSINOPHIL NFR BLD AUTO: 2.4 %
GLUCOSE SERPL-MCNC: 81 MG/DL
HCT VFR BLD CALC: 36.7 %
HGB BLD-MCNC: 11.7 G/DL
IMM GRANULOCYTES NFR BLD AUTO: 0.4 %
IRON SERPL-MCNC: 64 UG/DL
LYMPHOCYTES # BLD AUTO: 2.48 K/UL
LYMPHOCYTES NFR BLD AUTO: 46.3 %
MAN DIFF?: NORMAL
MCHC RBC-ENTMCNC: 31.3 PG
MCHC RBC-ENTMCNC: 31.9 GM/DL
MCV RBC AUTO: 98.1 FL
MONOCYTES # BLD AUTO: 0.45 K/UL
MONOCYTES NFR BLD AUTO: 8.4 %
NEUTROPHILS # BLD AUTO: 2.25 K/UL
NEUTROPHILS NFR BLD AUTO: 41.9 %
PLATELET # BLD AUTO: 415 K/UL
POTASSIUM SERPL-SCNC: 4.1 MMOL/L
PROT SERPL-MCNC: 6.9 G/DL
RBC # BLD: 3.74 M/UL
RBC # FLD: 13.7 %
SODIUM SERPL-SCNC: 139 MMOL/L
TSH SERPL-ACNC: 0.45 UIU/ML
WBC # FLD AUTO: 5.36 K/UL

## 2021-09-20 ENCOUNTER — APPOINTMENT (OUTPATIENT)
Dept: NEUROLOGY | Facility: CLINIC | Age: 41
End: 2021-09-20

## 2021-11-11 ENCOUNTER — RX RENEWAL (OUTPATIENT)
Age: 41
End: 2021-11-11

## 2021-12-28 ENCOUNTER — APPOINTMENT (OUTPATIENT)
Dept: INTERNAL MEDICINE | Facility: CLINIC | Age: 41
End: 2021-12-28
Payer: COMMERCIAL

## 2021-12-28 ENCOUNTER — NON-APPOINTMENT (OUTPATIENT)
Age: 41
End: 2021-12-28

## 2021-12-28 ENCOUNTER — MED ADMIN CHARGE (OUTPATIENT)
Age: 41
End: 2021-12-28

## 2021-12-28 PROCEDURE — 90707 MMR VACCINE SC: CPT

## 2021-12-28 PROCEDURE — 90471 IMMUNIZATION ADMIN: CPT

## 2021-12-30 ENCOUNTER — TRANSCRIPTION ENCOUNTER (OUTPATIENT)
Age: 41
End: 2021-12-30

## 2021-12-30 ENCOUNTER — RX RENEWAL (OUTPATIENT)
Age: 41
End: 2021-12-30

## 2022-01-25 ENCOUNTER — APPOINTMENT (OUTPATIENT)
Dept: INTERNAL MEDICINE | Facility: CLINIC | Age: 42
End: 2022-01-25

## 2022-02-02 DIAGNOSIS — Z23 ENCOUNTER FOR IMMUNIZATION: ICD-10-CM

## 2022-02-04 ENCOUNTER — APPOINTMENT (OUTPATIENT)
Dept: INTERNAL MEDICINE | Facility: CLINIC | Age: 42
End: 2022-02-04

## 2022-02-04 DIAGNOSIS — Z01.84 ENCOUNTER FOR ANTIBODY RESPONSE EXAMINATION: ICD-10-CM

## 2022-02-07 ENCOUNTER — NON-APPOINTMENT (OUTPATIENT)
Age: 42
End: 2022-02-07

## 2022-02-07 LAB
MEV IGG FLD QL IA: >300 AU/ML
MEV IGG+IGM SER-IMP: POSITIVE
MUV AB SER-ACNC: POSITIVE
MUV IGG SER QL IA: 50.2 AU/ML
RUBV IGG FLD-ACNC: 17.1 INDEX
RUBV IGG SER-IMP: POSITIVE

## 2022-02-14 ENCOUNTER — RX RENEWAL (OUTPATIENT)
Age: 42
End: 2022-02-14

## 2022-03-17 ENCOUNTER — RX RENEWAL (OUTPATIENT)
Age: 42
End: 2022-03-17

## 2022-04-21 ENCOUNTER — RX RENEWAL (OUTPATIENT)
Age: 42
End: 2022-04-21

## 2022-06-16 ENCOUNTER — RX RENEWAL (OUTPATIENT)
Age: 42
End: 2022-06-16

## 2022-06-24 ENCOUNTER — APPOINTMENT (OUTPATIENT)
Dept: INTERNAL MEDICINE | Facility: CLINIC | Age: 42
End: 2022-06-24
Payer: COMMERCIAL

## 2022-06-24 VITALS
SYSTOLIC BLOOD PRESSURE: 114 MMHG | DIASTOLIC BLOOD PRESSURE: 62 MMHG | WEIGHT: 179 LBS | HEART RATE: 87 BPM | TEMPERATURE: 98.3 F | RESPIRATION RATE: 16 BRPM | OXYGEN SATURATION: 98 % | BODY MASS INDEX: 32.94 KG/M2 | HEIGHT: 62 IN

## 2022-06-24 DIAGNOSIS — Z00.00 ENCOUNTER FOR GENERAL ADULT MEDICAL EXAMINATION W/OUT ABNORMAL FINDINGS: ICD-10-CM

## 2022-06-24 PROCEDURE — 99213 OFFICE O/P EST LOW 20 MIN: CPT | Mod: 25

## 2022-06-24 PROCEDURE — 36415 COLL VENOUS BLD VENIPUNCTURE: CPT

## 2022-06-24 RX ORDER — MECLIZINE HYDROCHLORIDE 25 MG/1
25 TABLET ORAL 3 TIMES DAILY
Qty: 30 | Refills: 2 | Status: DISCONTINUED | COMMUNITY
Start: 2021-07-27 | End: 2022-06-24

## 2022-06-24 NOTE — ASSESSMENT
[FreeTextEntry1] : \par 43 yo F pmhx allergies rhinitis, anxiety, anemia, B12 def, cold sores for f/u\par \par \par anxiety- controlled\par -on xanax 0.25 bid prn, iSTOP checked\par -followed by therapist prn\par \par obesity-\par -healthy eating, exercise advisd\par -med wt mgmnt referral given and encouraged\par -check TSH\par \par \par Pt advised to f/u with PMD Dr. Eisenberg for cont'd medical care/CPE and screening labs.\par -will relay visit to PMD\par \par pt's cell: [phone]\par \par Labs drawn in office today.\par

## 2022-06-24 NOTE — PHYSICAL EXAM
[No Acute Distress] : no acute distress [Well-Appearing] : well-appearing [Normal Sclera/Conjunctiva] : normal sclera/conjunctiva [PERRL] : pupils equal round and reactive to light [No Lymphadenopathy] : no lymphadenopathy [Supple] : supple [Thyroid Normal, No Nodules] : the thyroid was normal and there were no nodules present [No Respiratory Distress] : no respiratory distress  [Clear to Auscultation] : lungs were clear to auscultation bilaterally [Normal Rate] : normal rate  [Regular Rhythm] : with a regular rhythm [Normal S1, S2] : normal S1 and S2 [No Murmur] : no murmur heard [Pedal Pulses Present] : the pedal pulses are present [No Edema] : there was no peripheral edema [Soft] : abdomen soft [Non Tender] : non-tender [No HSM] : no HSM [Normal Supraclavicular Nodes] : no supraclavicular lymphadenopathy [Normal Posterior Cervical Nodes] : no posterior cervical lymphadenopathy [Normal Anterior Cervical Nodes] : no anterior cervical lymphadenopathy [No CVA Tenderness] : no CVA  tenderness [No Spinal Tenderness] : no spinal tenderness [No Joint Swelling] : no joint swelling [Grossly Normal Strength/Tone] : grossly normal strength/tone [No Rash] : no rash [No Focal Deficits] : no focal deficits [Normal Gait] : normal gait [Normal Affect] : the affect was normal [Alert and Oriented x3] : oriented to person, place, and time

## 2022-06-24 NOTE — HISTORY OF PRESENT ILLNESS
[FreeTextEntry1] : \par f/u [de-identified] : \par 41 yo F pmhx allergies rhinitis, anxiety, anemia, B12 def, cold sores for f/u\par \par cc: needs med refill\par \par hx anxiety- reports controlled\par -on Xanax 0.25 prn- taking ~ 1 x/wk\par -followed by therapist prn\par \par Feeling well otherwise, but c/o trouble losing wt- exercising since 1/22 despite exercise (runs 2 miles/d) and eats healthy diet.  Wants TSH checked.\par -med wt mgmnt eval pending, needs referral again\par \par Denies GI or  complaints.\par Denies heat/cold intolerance.\par

## 2022-06-25 LAB — TSH SERPL-ACNC: 0.6 UIU/ML

## 2022-07-12 ENCOUNTER — APPOINTMENT (OUTPATIENT)
Dept: INTERNAL MEDICINE | Facility: CLINIC | Age: 42
End: 2022-07-12

## 2022-08-30 ENCOUNTER — RX RENEWAL (OUTPATIENT)
Age: 42
End: 2022-08-30

## 2022-09-27 ENCOUNTER — APPOINTMENT (OUTPATIENT)
Dept: INTERNAL MEDICINE | Facility: CLINIC | Age: 42
End: 2022-09-27

## 2022-10-10 ENCOUNTER — RX RENEWAL (OUTPATIENT)
Age: 42
End: 2022-10-10

## 2022-11-29 ENCOUNTER — RX RENEWAL (OUTPATIENT)
Age: 42
End: 2022-11-29

## 2022-12-09 ENCOUNTER — APPOINTMENT (OUTPATIENT)
Dept: INTERNAL MEDICINE | Facility: CLINIC | Age: 42
End: 2022-12-09

## 2022-12-09 VITALS
DIASTOLIC BLOOD PRESSURE: 66 MMHG | WEIGHT: 179 LBS | SYSTOLIC BLOOD PRESSURE: 106 MMHG | HEART RATE: 78 BPM | TEMPERATURE: 98.4 F | BODY MASS INDEX: 32.94 KG/M2 | OXYGEN SATURATION: 98 % | HEIGHT: 62 IN

## 2022-12-09 DIAGNOSIS — E53.8 DEFICIENCY OF OTHER SPECIFIED B GROUP VITAMINS: ICD-10-CM

## 2022-12-09 DIAGNOSIS — E61.1 IRON DEFICIENCY: ICD-10-CM

## 2022-12-09 DIAGNOSIS — Z00.00 ENCOUNTER FOR GENERAL ADULT MEDICAL EXAMINATION W/OUT ABNORMAL FINDINGS: ICD-10-CM

## 2022-12-09 PROCEDURE — 99396 PREV VISIT EST AGE 40-64: CPT

## 2022-12-09 RX ORDER — FLUTICASONE PROPIONATE 50 UG/1
50 SPRAY, METERED NASAL DAILY
Qty: 1 | Refills: 3 | Status: ACTIVE | COMMUNITY
Start: 2020-10-07 | End: 1900-01-01

## 2022-12-09 RX ORDER — TRETINOIN 0.5 MG/G
0.05 CREAM TOPICAL
Qty: 20 | Refills: 0 | Status: ACTIVE | COMMUNITY
Start: 2022-06-24

## 2022-12-09 NOTE — PLAN
[FreeTextEntry1] : Ms. Figueroa presents for annual physical examination.\par \par 1.  Patient will be given a renewal for alprazolam 0.25 mg p.o. twice daily as needed for anxiety.  She is also been given a prescription for fluticasone propionate nasal spray.\par \par 2.  Prescription for CBC, CMP, hemoglobin A1c, lipid profile, TSH level and urinalysis, vitamin D level and B12 and folate levels\par \par 3.  Patient will continue to follow-up with her gynecologist regarding mammogram and Pap smears.\par \par 4.  Follow-up in this office in 1 year.

## 2022-12-09 NOTE — HISTORY OF PRESENT ILLNESS
[FreeTextEntry1] : Annual follow-up evaluation [de-identified] : Ms. Figueroa presents for an annual physical examination.\par She is without complaint.

## 2022-12-09 NOTE — HEALTH RISK ASSESSMENT
[Former] : Former [No] : No [Patient reported mammogram was normal] : Patient reported mammogram was normal [Patient reported PAP Smear was normal] : Patient reported PAP Smear was normal [Excellent] : ~his/her~ current health as excellent [YearQuit] : 2017 [MammogramDate] : 01/2022 [PapSmearDate] : 01/2022

## 2022-12-14 LAB
25(OH)D3 SERPL-MCNC: 26.1 NG/ML
ALBUMIN SERPL ELPH-MCNC: 4.4 G/DL
ALP BLD-CCNC: 63 U/L
ALT SERPL-CCNC: 15 U/L
ANION GAP SERPL CALC-SCNC: 13 MMOL/L
APPEARANCE: CLEAR
AST SERPL-CCNC: 21 U/L
BACTERIA: NEGATIVE
BASOPHILS # BLD AUTO: 0.03 K/UL
BASOPHILS NFR BLD AUTO: 0.5 %
BILIRUB SERPL-MCNC: 0.5 MG/DL
BILIRUBIN URINE: NEGATIVE
BLOOD URINE: NEGATIVE
BUN SERPL-MCNC: 6 MG/DL
CALCIUM SERPL-MCNC: 9.1 MG/DL
CHLORIDE SERPL-SCNC: 101 MMOL/L
CHOLEST SERPL-MCNC: 181 MG/DL
CO2 SERPL-SCNC: 20 MMOL/L
COLOR: NORMAL
CREAT SERPL-MCNC: 0.61 MG/DL
EGFR: 114 ML/MIN/1.73M2
EOSINOPHIL # BLD AUTO: 0.11 K/UL
EOSINOPHIL NFR BLD AUTO: 2 %
ESTIMATED AVERAGE GLUCOSE: 100 MG/DL
FOLATE SERPL-MCNC: >20 NG/ML
GLUCOSE QUALITATIVE U: NEGATIVE
GLUCOSE SERPL-MCNC: 90 MG/DL
HBA1C MFR BLD HPLC: 5.1 %
HCT VFR BLD CALC: 38.1 %
HDLC SERPL-MCNC: 52 MG/DL
HGB BLD-MCNC: 12.5 G/DL
HYALINE CASTS: 1 /LPF
IMM GRANULOCYTES NFR BLD AUTO: 0.5 %
IRON SERPL-MCNC: 119 UG/DL
KETONES URINE: NEGATIVE
LDLC SERPL CALC-MCNC: 113 MG/DL
LEUKOCYTE ESTERASE URINE: NEGATIVE
LYMPHOCYTES # BLD AUTO: 2.38 K/UL
LYMPHOCYTES NFR BLD AUTO: 43.1 %
MAN DIFF?: NORMAL
MCHC RBC-ENTMCNC: 30.9 PG
MCHC RBC-ENTMCNC: 32.8 GM/DL
MCV RBC AUTO: 94.3 FL
MICROSCOPIC-UA: NORMAL
MONOCYTES # BLD AUTO: 0.46 K/UL
MONOCYTES NFR BLD AUTO: 8.3 %
NEUTROPHILS # BLD AUTO: 2.51 K/UL
NEUTROPHILS NFR BLD AUTO: 45.6 %
NITRITE URINE: NEGATIVE
NONHDLC SERPL-MCNC: 128 MG/DL
PH URINE: 7
PLATELET # BLD AUTO: 347 K/UL
POTASSIUM SERPL-SCNC: 4.2 MMOL/L
PROT SERPL-MCNC: 7.1 G/DL
PROTEIN URINE: NEGATIVE
RBC # BLD: 4.04 M/UL
RBC # FLD: 12.7 %
RED BLOOD CELLS URINE: 2 /HPF
SODIUM SERPL-SCNC: 135 MMOL/L
SPECIFIC GRAVITY URINE: 1.01
SQUAMOUS EPITHELIAL CELLS: 1 /HPF
TRIGL SERPL-MCNC: 78 MG/DL
TSH SERPL-ACNC: 0.46 UIU/ML
UROBILINOGEN URINE: NORMAL
VIT B12 SERPL-MCNC: 287 PG/ML
WBC # FLD AUTO: 5.52 K/UL
WHITE BLOOD CELLS URINE: 0 /HPF

## 2022-12-15 ENCOUNTER — NON-APPOINTMENT (OUTPATIENT)
Age: 42
End: 2022-12-15

## 2023-01-17 ENCOUNTER — RX RENEWAL (OUTPATIENT)
Age: 43
End: 2023-01-17

## 2023-02-27 ENCOUNTER — RX RENEWAL (OUTPATIENT)
Age: 43
End: 2023-02-27

## 2023-04-04 ENCOUNTER — RX RENEWAL (OUTPATIENT)
Age: 43
End: 2023-04-04

## 2023-04-05 ENCOUNTER — APPOINTMENT (OUTPATIENT)
Dept: INTERNAL MEDICINE | Facility: CLINIC | Age: 43
End: 2023-04-05
Payer: COMMERCIAL

## 2023-04-05 VITALS
RESPIRATION RATE: 16 BRPM | HEIGHT: 62 IN | TEMPERATURE: 98.8 F | BODY MASS INDEX: 31.28 KG/M2 | SYSTOLIC BLOOD PRESSURE: 120 MMHG | HEART RATE: 72 BPM | OXYGEN SATURATION: 98 % | DIASTOLIC BLOOD PRESSURE: 80 MMHG | WEIGHT: 170 LBS

## 2023-04-05 DIAGNOSIS — F41.9 ANXIETY DISORDER, UNSPECIFIED: ICD-10-CM

## 2023-04-05 DIAGNOSIS — Z87.891 PERSONAL HISTORY OF NICOTINE DEPENDENCE: ICD-10-CM

## 2023-04-05 DIAGNOSIS — R79.89 OTHER SPECIFIED ABNORMAL FINDINGS OF BLOOD CHEMISTRY: ICD-10-CM

## 2023-04-05 DIAGNOSIS — Z86.59 PERSONAL HISTORY OF OTHER MENTAL AND BEHAVIORAL DISORDERS: ICD-10-CM

## 2023-04-05 DIAGNOSIS — E55.9 VITAMIN D DEFICIENCY, UNSPECIFIED: ICD-10-CM

## 2023-04-05 DIAGNOSIS — E66.9 OBESITY, UNSPECIFIED: ICD-10-CM

## 2023-04-05 DIAGNOSIS — Z87.898 PERSONAL HISTORY OF OTHER SPECIFIED CONDITIONS: ICD-10-CM

## 2023-04-05 DIAGNOSIS — N62 HYPERTROPHY OF BREAST: ICD-10-CM

## 2023-04-05 DIAGNOSIS — J30.9 ALLERGIC RHINITIS, UNSPECIFIED: ICD-10-CM

## 2023-04-05 PROCEDURE — 99213 OFFICE O/P EST LOW 20 MIN: CPT

## 2023-04-05 NOTE — HISTORY OF PRESENT ILLNESS
[FreeTextEntry1] : Follow-up [de-identified] : Ms. Figueroa presents for follow-up evaluation.  She is feeling well.  Patient states that she is planning on having a bilateral breast reduction and abdominoplasty in early June.  She does get episodes of back pain as a result of her enlarged breast.  She denies any chest pain or palpitations.  There is no shortness of breath.

## 2023-04-05 NOTE — PLAN
[FreeTextEntry1] : Ms. Figueroa presents for follow-up evaluation.\par She is planning on elective bilateral breast reduction surgery as well as abdominal plasty.\par At present, there is no contraindication to planned procedure, however, patient will schedule a medical clearance evaluation.\par Follow-up in 6 months

## 2023-05-16 ENCOUNTER — RX RENEWAL (OUTPATIENT)
Age: 43
End: 2023-05-16

## 2023-05-16 ENCOUNTER — APPOINTMENT (OUTPATIENT)
Dept: INTERNAL MEDICINE | Facility: CLINIC | Age: 43
End: 2023-05-16
Payer: COMMERCIAL

## 2023-05-16 VITALS
TEMPERATURE: 98.8 F | HEART RATE: 77 BPM | OXYGEN SATURATION: 97 % | DIASTOLIC BLOOD PRESSURE: 60 MMHG | BODY MASS INDEX: 31.28 KG/M2 | HEIGHT: 62 IN | WEIGHT: 170 LBS | SYSTOLIC BLOOD PRESSURE: 102 MMHG

## 2023-05-16 DIAGNOSIS — R53.83 OTHER FATIGUE: ICD-10-CM

## 2023-05-16 DIAGNOSIS — F17.200 NICOTINE DEPENDENCE, UNSPECIFIED, UNCOMPLICATED: ICD-10-CM

## 2023-05-16 DIAGNOSIS — T63.301A TOXIC EFFECT OF UNSPECIFIED SPIDER VENOM, ACCIDENTAL (UNINTENTIONAL), INITIAL ENCOUNTER: ICD-10-CM

## 2023-05-16 PROCEDURE — 99213 OFFICE O/P EST LOW 20 MIN: CPT

## 2023-05-16 NOTE — HISTORY OF PRESENT ILLNESS
[FreeTextEntry1] : Spider bites; fatigue [de-identified] : Ms. Figueroa was at her daughter's graduation in [place] 2 weeks ago.  She was sitting in her friend's yard when she received multiple spider bites to both feet and legs.  Initially there was reddened areas around the bites however currently they have resolved.  She is complaining of some fatigue.  Ms. Figueroa has no fevers or chills.

## 2023-05-16 NOTE — PLAN
[FreeTextEntry1] : Ms. Figueroa presents for a follow-up evaluation.  Her spider bites have healed.  She is complaining of fatigue.  We will repeat CBC, CMP, iron level, TSH level and urinalysis.  Patient has an appointment in 1 month for complete physical evaluation.

## 2023-05-17 LAB
ALBUMIN SERPL ELPH-MCNC: 4.2 G/DL
ALP BLD-CCNC: 71 U/L
ALT SERPL-CCNC: 18 U/L
ANION GAP SERPL CALC-SCNC: 12 MMOL/L
AST SERPL-CCNC: 16 U/L
BASOPHILS # BLD AUTO: 0.03 K/UL
BASOPHILS NFR BLD AUTO: 0.5 %
BILIRUB SERPL-MCNC: 0.2 MG/DL
BUN SERPL-MCNC: 9 MG/DL
CALCIUM SERPL-MCNC: 9.2 MG/DL
CHLORIDE SERPL-SCNC: 108 MMOL/L
CO2 SERPL-SCNC: 21 MMOL/L
CREAT SERPL-MCNC: 0.63 MG/DL
EGFR: 114 ML/MIN/1.73M2
EOSINOPHIL # BLD AUTO: 0.19 K/UL
EOSINOPHIL NFR BLD AUTO: 3.3 %
GLUCOSE SERPL-MCNC: 107 MG/DL
HCT VFR BLD CALC: 36.8 %
HGB BLD-MCNC: 11.7 G/DL
IMM GRANULOCYTES NFR BLD AUTO: 0.2 %
IRON SERPL-MCNC: 31 UG/DL
LYMPHOCYTES # BLD AUTO: 1.97 K/UL
LYMPHOCYTES NFR BLD AUTO: 34.6 %
MAN DIFF?: NORMAL
MCHC RBC-ENTMCNC: 30.3 PG
MCHC RBC-ENTMCNC: 31.8 GM/DL
MCV RBC AUTO: 95.3 FL
MONOCYTES # BLD AUTO: 0.45 K/UL
MONOCYTES NFR BLD AUTO: 7.9 %
NEUTROPHILS # BLD AUTO: 3.05 K/UL
NEUTROPHILS NFR BLD AUTO: 53.5 %
PLATELET # BLD AUTO: 418 K/UL
POTASSIUM SERPL-SCNC: 4.5 MMOL/L
PROT SERPL-MCNC: 6.6 G/DL
RBC # BLD: 3.86 M/UL
RBC # FLD: 13.7 %
SODIUM SERPL-SCNC: 140 MMOL/L
TSH SERPL-ACNC: 0.71 UIU/ML
WBC # FLD AUTO: 5.7 K/UL

## 2023-05-18 ENCOUNTER — NON-APPOINTMENT (OUTPATIENT)
Age: 43
End: 2023-05-18

## 2023-05-18 LAB
APPEARANCE: CLEAR
BACTERIA: NEGATIVE /HPF
BILIRUBIN URINE: NEGATIVE
BLOOD URINE: ABNORMAL
CAST: 0 /LPF
COLOR: YELLOW
EPITHELIAL CELLS: 3 /HPF
GLUCOSE QUALITATIVE U: NEGATIVE MG/DL
KETONES URINE: NEGATIVE MG/DL
LEUKOCYTE ESTERASE URINE: NEGATIVE
MICROSCOPIC-UA: NORMAL
NITRITE URINE: NEGATIVE
PH URINE: 5.5
PROTEIN URINE: NEGATIVE MG/DL
RED BLOOD CELLS URINE: 1 /HPF
REVIEW: NORMAL
SPECIFIC GRAVITY URINE: 1.02
UROBILINOGEN URINE: 0.2 MG/DL
WHITE BLOOD CELLS URINE: 0 /HPF

## 2023-06-16 ENCOUNTER — APPOINTMENT (OUTPATIENT)
Dept: INTERNAL MEDICINE | Facility: CLINIC | Age: 43
End: 2023-06-16

## 2023-07-31 ENCOUNTER — RX RENEWAL (OUTPATIENT)
Age: 43
End: 2023-07-31

## 2023-09-26 ENCOUNTER — RX RENEWAL (OUTPATIENT)
Age: 43
End: 2023-09-26

## 2023-11-17 ENCOUNTER — RX RENEWAL (OUTPATIENT)
Age: 43
End: 2023-11-17

## 2024-01-10 ENCOUNTER — RX RENEWAL (OUTPATIENT)
Age: 44
End: 2024-01-10

## 2024-02-27 ENCOUNTER — RX RENEWAL (OUTPATIENT)
Age: 44
End: 2024-02-27

## 2024-04-10 ENCOUNTER — RX RENEWAL (OUTPATIENT)
Age: 44
End: 2024-04-10

## 2024-04-10 RX ORDER — VALACYCLOVIR 500 MG/1
500 TABLET, FILM COATED ORAL TWICE DAILY
Qty: 20 | Refills: 3 | Status: ACTIVE | COMMUNITY
Start: 2018-05-17 | End: 1900-01-01

## 2024-05-28 ENCOUNTER — RX RENEWAL (OUTPATIENT)
Age: 44
End: 2024-05-28

## 2024-05-28 RX ORDER — ALPRAZOLAM 0.25 MG/1
0.25 TABLET ORAL
Qty: 60 | Refills: 0 | Status: ACTIVE | COMMUNITY
Start: 2018-05-17 | End: 1900-01-01

## 2024-07-09 ENCOUNTER — RX RENEWAL (OUTPATIENT)
Age: 44
End: 2024-07-09

## 2024-08-19 ENCOUNTER — RX RENEWAL (OUTPATIENT)
Age: 44
End: 2024-08-19

## 2024-10-01 ENCOUNTER — RX RENEWAL (OUTPATIENT)
Age: 44
End: 2024-10-01

## 2024-11-06 ENCOUNTER — RX RENEWAL (OUTPATIENT)
Age: 44
End: 2024-11-06

## 2024-12-24 PROBLEM — F10.90 ALCOHOL USE: Status: ACTIVE | Noted: 2017-04-27

## 2024-12-30 ENCOUNTER — RX RENEWAL (OUTPATIENT)
Age: 44
End: 2024-12-30

## 2025-02-10 ENCOUNTER — RX RENEWAL (OUTPATIENT)
Age: 45
End: 2025-02-10

## 2025-03-28 ENCOUNTER — RX RENEWAL (OUTPATIENT)
Age: 45
End: 2025-03-28

## 2025-05-05 ENCOUNTER — RX RENEWAL (OUTPATIENT)
Age: 45
End: 2025-05-05

## 2025-06-03 ENCOUNTER — RX RENEWAL (OUTPATIENT)
Age: 45
End: 2025-06-03

## 2025-07-03 ENCOUNTER — RX RENEWAL (OUTPATIENT)
Age: 45
End: 2025-07-03

## 2025-08-08 ENCOUNTER — RX RENEWAL (OUTPATIENT)
Age: 45
End: 2025-08-08

## 2025-09-08 ENCOUNTER — RX RENEWAL (OUTPATIENT)
Age: 45
End: 2025-09-08